# Patient Record
Sex: FEMALE | Race: WHITE | NOT HISPANIC OR LATINO | Employment: OTHER | URBAN - METROPOLITAN AREA
[De-identification: names, ages, dates, MRNs, and addresses within clinical notes are randomized per-mention and may not be internally consistent; named-entity substitution may affect disease eponyms.]

---

## 2017-02-03 ENCOUNTER — TELEPHONE (OUTPATIENT)
Dept: NEUROSURGERY | Facility: CLINIC | Age: 32
End: 2017-02-03

## 2017-02-03 DIAGNOSIS — S06.5XAA SUBDURAL HEMATOMA: Primary | ICD-10-CM

## 2017-02-09 ENCOUNTER — TELEPHONE (OUTPATIENT)
Dept: INTERNAL MEDICINE | Facility: CLINIC | Age: 32
End: 2017-02-09

## 2017-02-09 DIAGNOSIS — M79.641 RIGHT HAND PAIN: Primary | ICD-10-CM
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
## 2017-02-10 ENCOUNTER — OFFICE VISIT (OUTPATIENT)
Dept: INTERNAL MEDICINE | Facility: CLINIC | Age: 32
End: 2017-02-10
Payer: MEDICAID

## 2017-02-10 ENCOUNTER — HOSPITAL ENCOUNTER (OUTPATIENT)
Dept: RADIOLOGY | Facility: HOSPITAL | Age: 32
Discharge: HOME | End: 2017-02-10
Attending: INTERNAL MEDICINE
Payer: MEDICAID

## 2017-02-10 VITALS
HEIGHT: 60 IN | SYSTOLIC BLOOD PRESSURE: 100 MMHG | BODY MASS INDEX: 24.37 KG/M2 | WEIGHT: 124.13 LBS | DIASTOLIC BLOOD PRESSURE: 60 MMHG | HEART RATE: 82 BPM | OXYGEN SATURATION: 98 %

## 2017-02-10 DIAGNOSIS — M79.641 RIGHT HAND PAIN: ICD-10-CM

## 2017-02-10 DIAGNOSIS — F70 MILD MENTAL RETARDATION: ICD-10-CM

## 2017-02-10 DIAGNOSIS — F91.9 DESTRUCTIVE BEHAVIOR DISORDER: ICD-10-CM

## 2017-02-10 DIAGNOSIS — S62.609A FINGER FRACTURE, RIGHT, CLOSED, INITIAL ENCOUNTER: Primary | ICD-10-CM

## 2017-02-10 PROCEDURE — 73130 X-RAY EXAM OF HAND: CPT | Mod: 26,RT,, | Performed by: RADIOLOGY

## 2017-02-10 PROCEDURE — 99214 OFFICE O/P EST MOD 30 MIN: CPT | Mod: S$PBB,,, | Performed by: INTERNAL MEDICINE

## 2017-02-10 PROCEDURE — 99999 PR PBB SHADOW E&M-EST. PATIENT-LVL II: CPT | Mod: PBBFAC,,, | Performed by: INTERNAL MEDICINE

## 2017-02-10 PROCEDURE — 99212 OFFICE O/P EST SF 10 MIN: CPT | Mod: PBBFAC | Performed by: INTERNAL MEDICINE

## 2017-02-10 RX ORDER — CLONAZEPAM 0.5 MG/1
0.5 TABLET ORAL DAILY
COMMUNITY

## 2017-02-10 RX ORDER — DOCUSATE SODIUM 100 MG/1
100 CAPSULE, LIQUID FILLED ORAL DAILY
COMMUNITY
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
## 2017-02-16 ENCOUNTER — DOCUMENTATION ONLY (OUTPATIENT)
Dept: INTERNAL MEDICINE | Facility: CLINIC | Age: 32
End: 2017-02-16

## 2017-02-16 ENCOUNTER — HOSPITAL ENCOUNTER (EMERGENCY)
Facility: HOSPITAL | Age: 32
Discharge: HOME | End: 2017-02-18
Attending: EMERGENCY MEDICINE
Payer: MEDICAID

## 2017-02-16 DIAGNOSIS — F70 MILD INTELLECTUAL DISABILITY: ICD-10-CM

## 2017-02-16 DIAGNOSIS — Z86.59 HISTORY OF IMPULSIVE BEHAVIOR: ICD-10-CM

## 2017-02-16 DIAGNOSIS — R46.89 AGGRESSIVE BEHAVIOR: Primary | ICD-10-CM

## 2017-02-16 LAB
ALBUMIN SERPL BCP-MCNC: 3 G/DL
ALP SERPL-CCNC: 29 U/L
ALT SERPL W/O P-5'-P-CCNC: 18 U/L
AMPHET+METHAMPHET UR QL: NEGATIVE
ANION GAP SERPL CALC-SCNC: 4 MMOL/L
AST SERPL-CCNC: 20 U/L
B-HCG UR QL: NEGATIVE
BARBITURATES UR QL SCN>200 NG/ML: NEGATIVE
BASOPHILS # BLD AUTO: 0.02 K/UL
BASOPHILS NFR BLD: 0.3 %
BENZODIAZ UR QL SCN>200 NG/ML: NEGATIVE
BILIRUB SERPL-MCNC: 0.2 MG/DL
BILIRUB UR QL STRIP: NEGATIVE
BILIRUB UR QL STRIP: NEGATIVE
BUN SERPL-MCNC: 14 MG/DL
BZE UR QL SCN: NEGATIVE
CALCIUM SERPL-MCNC: 8.8 MG/DL
CANNABINOIDS UR QL SCN: NEGATIVE
CHLORIDE SERPL-SCNC: 105 MMOL/L
CLARITY UR REFRACT.AUTO: CLEAR
CLARITY UR REFRACT.AUTO: CLEAR
CO2 SERPL-SCNC: 28 MMOL/L
COLOR UR AUTO: YELLOW
COLOR UR AUTO: YELLOW
CREAT SERPL-MCNC: 0.6 MG/DL
CREAT UR-MCNC: 81 MG/DL
DIFFERENTIAL METHOD: ABNORMAL
EOSINOPHIL # BLD AUTO: 0.1 K/UL
EOSINOPHIL NFR BLD: 1.9 %
ERYTHROCYTE [DISTWIDTH] IN BLOOD BY AUTOMATED COUNT: 11.9 %
EST. GFR  (AFRICAN AMERICAN): >60 ML/MIN/1.73 M^2
EST. GFR  (NON AFRICAN AMERICAN): >60 ML/MIN/1.73 M^2
GLUCOSE SERPL-MCNC: 82 MG/DL
GLUCOSE UR QL STRIP: NEGATIVE
GLUCOSE UR QL STRIP: NEGATIVE
HCT VFR BLD AUTO: 36.6 %
HGB BLD-MCNC: 12.3 G/DL
HGB UR QL STRIP: NEGATIVE
HGB UR QL STRIP: NEGATIVE
KETONES UR QL STRIP: NEGATIVE
KETONES UR QL STRIP: NEGATIVE
LEUKOCYTE ESTERASE UR QL STRIP: NEGATIVE
LEUKOCYTE ESTERASE UR QL STRIP: NEGATIVE
LYMPHOCYTES # BLD AUTO: 3 K/UL
LYMPHOCYTES NFR BLD: 42.1 %
MCH RBC QN AUTO: 31.5 PG
MCHC RBC AUTO-ENTMCNC: 33.6 %
MCV RBC AUTO: 94 FL
METHADONE UR QL SCN>300 NG/ML: NEGATIVE
MONOCYTES # BLD AUTO: 0.8 K/UL
MONOCYTES NFR BLD: 11.6 %
NEUTROPHILS # BLD AUTO: 3.1 K/UL
NEUTROPHILS NFR BLD: 43.7 %
NITRITE UR QL STRIP: NEGATIVE
NITRITE UR QL STRIP: NEGATIVE
OPIATES UR QL SCN: NEGATIVE
PCP UR QL SCN>25 NG/ML: NEGATIVE
PH UR STRIP: 8 [PH] (ref 5–8)
PH UR STRIP: 8 [PH] (ref 5–8)
PLATELET # BLD AUTO: 166 K/UL
PMV BLD AUTO: 9.5 FL
POTASSIUM SERPL-SCNC: 4 MMOL/L
PROT SERPL-MCNC: 6.5 G/DL
PROT UR QL STRIP: NEGATIVE
PROT UR QL STRIP: NEGATIVE
RBC # BLD AUTO: 3.91 M/UL
SODIUM SERPL-SCNC: 137 MMOL/L
SP GR UR STRIP: 1.01 (ref 1–1.03)
SP GR UR STRIP: 1.01 (ref 1–1.03)
TOXICOLOGY INFORMATION: NORMAL
TSH SERPL DL<=0.005 MIU/L-ACNC: 2.11 UIU/ML
URN SPEC COLLECT METH UR: NORMAL
URN SPEC COLLECT METH UR: NORMAL
UROBILINOGEN UR STRIP-ACNC: NEGATIVE EU/DL
UROBILINOGEN UR STRIP-ACNC: NEGATIVE EU/DL
WBC # BLD AUTO: 7.01 K/UL

## 2017-02-16 PROCEDURE — 25000003 PHARM REV CODE 250: Performed by: EMERGENCY MEDICINE

## 2017-02-16 PROCEDURE — 99285 EMERGENCY DEPT VISIT HI MDM: CPT | Mod: 25

## 2017-02-16 PROCEDURE — 80053 COMPREHEN METABOLIC PANEL: CPT

## 2017-02-16 PROCEDURE — 96372 THER/PROPH/DIAG INJ SC/IM: CPT

## 2017-02-16 PROCEDURE — 25000003 PHARM REV CODE 250: Performed by: STUDENT IN AN ORGANIZED HEALTH CARE EDUCATION/TRAINING PROGRAM

## 2017-02-16 PROCEDURE — 82570 ASSAY OF URINE CREATININE: CPT

## 2017-02-16 PROCEDURE — 85025 COMPLETE CBC W/AUTO DIFF WBC: CPT

## 2017-02-16 PROCEDURE — 81025 URINE PREGNANCY TEST: CPT

## 2017-02-16 PROCEDURE — 99284 EMERGENCY DEPT VISIT MOD MDM: CPT | Mod: ,,, | Performed by: EMERGENCY MEDICINE

## 2017-02-16 PROCEDURE — 81003 URINALYSIS AUTO W/O SCOPE: CPT

## 2017-02-16 PROCEDURE — 84443 ASSAY THYROID STIM HORMONE: CPT

## 2017-02-16 RX ORDER — CLONAZEPAM 0.5 MG/1
0.5 TABLET ORAL 2 TIMES DAILY
Status: DISCONTINUED | OUTPATIENT
Start: 2017-02-16 | End: 2017-02-16

## 2017-02-16 RX ORDER — DOCUSATE SODIUM 100 MG/1
100 CAPSULE, LIQUID FILLED ORAL 2 TIMES DAILY
Status: DISCONTINUED | OUTPATIENT
Start: 2017-02-16 | End: 2017-02-18 | Stop reason: HOSPADM

## 2017-02-16 RX ORDER — HALOPERIDOL 5 MG/ML
5 INJECTION INTRAMUSCULAR EVERY 4 HOURS PRN
Status: DISCONTINUED | OUTPATIENT
Start: 2017-02-16 | End: 2017-02-18 | Stop reason: HOSPADM

## 2017-02-16 RX ORDER — DIVALPROEX SODIUM 250 MG/1
500 TABLET, DELAYED RELEASE ORAL EVERY 12 HOURS
Status: DISCONTINUED | OUTPATIENT
Start: 2017-02-16 | End: 2017-02-18 | Stop reason: HOSPADM

## 2017-02-16 RX ORDER — FERROUS SULFATE, DRIED 160(50) MG
1 TABLET, EXTENDED RELEASE ORAL 2 TIMES DAILY
Status: DISCONTINUED | OUTPATIENT
Start: 2017-02-16 | End: 2017-02-18 | Stop reason: HOSPADM

## 2017-02-16 RX ORDER — CLONAZEPAM 0.5 MG/1
0.5 TABLET ORAL 2 TIMES DAILY
Status: DISCONTINUED | OUTPATIENT
Start: 2017-02-16 | End: 2017-02-18 | Stop reason: HOSPADM

## 2017-02-16 RX ORDER — SERTRALINE HYDROCHLORIDE 50 MG/1
100 TABLET, FILM COATED ORAL DAILY
Status: DISCONTINUED | OUTPATIENT
Start: 2017-02-16 | End: 2017-02-17

## 2017-02-16 RX ORDER — LORAZEPAM 1 MG/1
1 TABLET ORAL
Status: DISCONTINUED | OUTPATIENT
Start: 2017-02-16 | End: 2017-02-16

## 2017-02-16 RX ORDER — SERTRALINE HYDROCHLORIDE 50 MG/1
100 TABLET, FILM COATED ORAL DAILY
Status: DISCONTINUED | OUTPATIENT
Start: 2017-02-17 | End: 2017-02-16

## 2017-02-16 RX ORDER — LORAZEPAM 2 MG/ML
2 INJECTION INTRAMUSCULAR EVERY 6 HOURS PRN
Status: DISCONTINUED | OUTPATIENT
Start: 2017-02-16 | End: 2017-02-18 | Stop reason: HOSPADM

## 2017-02-16 RX ORDER — LORAZEPAM 1 MG/1
1 TABLET ORAL
Status: COMPLETED | OUTPATIENT
Start: 2017-02-16 | End: 2017-02-16

## 2017-02-16 RX ADMIN — SERTRALINE HYDROCHLORIDE 100 MG: 50 TABLET ORAL at 09:02

## 2017-02-16 RX ADMIN — LORAZEPAM 1 MG: 1 TABLET ORAL at 07:02

## 2017-02-16 RX ADMIN — DIVALPROEX SODIUM 500 MG: 250 TABLET, DELAYED RELEASE ORAL at 09:02

## 2017-02-16 RX ADMIN — CLONAZEPAM 0.5 MG: 0.5 TABLET ORAL at 09:02

## 2017-02-16 NOTE — ED AVS SNAPSHOT
[facility]  [address]  [facility] [address]               Sydnee Bosch   2017  4:50 PM   ED    Description:  Female : 1985   [facility]           Your Care was Coordinated By:     Provider Role From To    Kushal Polo MD Attending Provider [id number] --      Reason for Visit     Psychiatric Evaluation           Diagnoses this Visit        Comments    Aggressive behavior    -  Primary     History of impulsive behavior         Mild intellectual disability           ED Disposition     ED Disposition Condition Comment    Transfer to Another Facility  Review           To Do List           Follow-up Information     Follow [facility] with [facility]    Specialty:  Emergency Medicine    Why:  If symptoms worsen    Contact information:    [address]  [facility] [id number] [phone]      [facility] On [facility]     [facility] -  Assistance  Registered nurses in the [facility] provide clinical advisement, health education, appointment booking, and other advisory services.  Call for this free service at [phone].             Medications           Message regarding Medications     Verify the changes and/or additions to your medication regime listed below are the same as discussed with your clinician today.  If any of these changes or additions are incorrect, please notify your healthcare provider.        These medications were administered today        Dose Freq    lorazepam tablet 1 mg 1 mg ED 1 Time    Sig: Take 1 tablet (1 mg total) by mouth ED 1 Time.    Class: Normal    Route: Oral    haloperidol lactate injection 5 mg 5 mg Every 4 hours PRN    Sig: Inject 1 mL (5 mg total) into the muscle every 4 (four) hours as needed for Agitation.    Class: Normal    Route: Intramuscular    lorazepam injection 2 mg 2 mg Every 6 hours PRN    Sig: Inject 1 mL (2 mg  total) into the muscle every 6 (six) hours as needed for Anxiety (non-redirectable agitation).    Class: Normal    Route: Intramuscular    docusate sodium capsule 100 mg 100 mg 2 times daily    Sig: Take 1 capsule (100 mg total) by mouth 2 (two) times daily.    Class: Normal    Route: Oral    lorazepam tablet 2 mg 2 mg ED 1 Time    Sig: Take 2 tablets (2 mg total) by mouth ED 1 Time.    Class: Normal    Route: Oral    sertraline tablet 50 mg 50 mg Daily    Sig: Take 1 tablet (50 mg total) by mouth once daily.    Class: Normal    Route: Oral           Verify that the below list of medications is an accurate representation of the medications you are currently taking.  If none reported, the list may be blank. If incorrect, please contact your healthcare provider. Carry this list with you in case of emergency.           Current Medications     CALCIUM CARBONATE/VITAMIN D3 (CALCIUM 600 + D,3, ORAL) Take 1 tablet by mouth 2 (two) times daily.    calcium-vitamin D3 500 mg(1,250mg) -200 unit per tablet 1 tablet Take 1 tablet by mouth 2 (two) times daily.    chlorproMAZINE (THORAZINE) 25 mg/mL injection Inject 2 mLs (50 mg total) into the muscle every 6 (six) hours as needed (2nd line agent for agitation, if patient refuses po medications). Per Psychiatry recommendations    chlorproMAZINE (THORAZINE) 50 MG tablet Take 1 tablet (50 mg total) by mouth every 6 (six) hours as needed (1st line agent for agitation, please use before IM thorazine). Per Psychiatry recommendations    clonazePAM (KLONOPIN) 0.5 MG tablet Take 0.5 mg by mouth 2 (two) times daily.    clonazePAM tablet 0.5 mg Take 1 tablet (0.5 mg total) by mouth 2 (two) times daily.    divalproex (DEPAKOTE) 500 MG TbEC Take 1 tablet (500 mg total) by mouth every 12 (twelve) hours. Patient was taking qAM and with lunch    divalproex EC tablet 500 mg Take 2 tablets (500 mg total) by mouth every 12 (twelve) hours.    docusate sodium (COLACE) 100 MG capsule Take 100 mg by  mouth 2 (two) times daily.    docusate sodium capsule 100 mg Take 1 capsule (100 mg total) by mouth 2 (two) times daily.    haloperidol lactate injection 5 mg Inject 1 mL (5 mg total) into the muscle every 4 (four) hours as needed for Agitation.    lorazepam injection 2 mg Inject 1 mL (2 mg total) into the muscle every 6 (six) hours as needed for Anxiety (non-redirectable agitation).    norgestimate-ethinyl estradiol (TRINESSA, 28,) 0.18/0.215/0.25 mg-35 mcg (28) tablet Take 1 tablet by mouth once daily.    sertraline (ZOLOFT) 100 MG tablet Take 1 tablet (100 mg total) by mouth once daily.    sertraline tablet 100 mg Take 2 tablets (100 mg total) by mouth every evening.           Clinical Reference Information           Your Vitals Were     BP Pulse Temp Resp Weight SpO2    112/79 85 98.3 °F (36.8 °C) (Oral) 18 61.2 kg (135 lb) 99%    BMI                26.37 kg/m2          Allergies as of 2/18/2017     No Known Allergies      Immunizations Administered on Date of Encounter - 2/18/2017     None      ED Micro, Lab, POCT     Start Ordered       Status Ordering Provider    02/18/17 0400 02/17/17 1051  Valproic Acid  Early Morning Draw      Final result     02/16/17 1726 02/16/17 1725  Pregnancy, urine rapid  STAT      Final result     02/16/17 1724 02/16/17 1724  Urinalysis - clean catch  STAT      Final result     02/16/17 1721 02/16/17 1722  CBC auto differential  STAT      Final result     02/16/17 1721 02/16/17 1722  Comprehensive metabolic panel  STAT      Final result     02/16/17 1721 02/16/17 1722  TSH  STAT      Final result     02/16/17 1721 02/16/17 1722  Urinalysis - clean catch  STAT      Final result     02/16/17 1721 02/16/17 1722  Drug screen panel, emergency  STAT      Final result       ED Imaging Orders     None        Discharge Instructions         When a Loved One Has a Mental Illness  Its hard to watch a loved one deal with mental illness. You want to help. Yet you may not know what to do. Your  loved one may even push you away. But dont give up. Your support is needed now more than ever. Talk to your loved ones health care provider. Or, contact a group for families of people with mental illness. They can help give you the guidance you need.    What you can do  Living with mental illness can be overwhelming. Your loved one may say or do things that shock or frighten you. Sometimes, your loved one may resist treatment. Knowing what to do can help you cope:  · Help your loved one get proper care. Often, people with a mental illness deny theres a problem. Or, they may not be able to seek help on their own.  · Encourage your loved one to stick with treatment. This may be your most crucial job. Medicines that treat mental illness can have side effects. As a result, your loved one may stop taking them. But this will likely cause symptoms to come back. You might also want to attend health care provider visits with your loved one to discuss medicine and other issues.  · Provide emotional support. Encourage your loved one to share his or her feelings. Listen, and dont . Let your loved one know he or she can count on you.  · Be patient. The healing process takes time. In some cases, your loved one may never fully recover. But his or her symptoms will likely improve.  · Invite your loved one to take part in activities. But dont push.  · Take care of yourself. Helping your loved one can be very stressful. Take time to care for yourself. Youll have more patience and will be better able to cope.  Resources  [employer] on Mental Illness 615-736-3254 www.bhavna.org  National [place] of Mental Health 860-507-7647 www.nimh.nih.gov  Mental Health [place] [phone] www.nmha.org   Date Last Reviewed: 6/10/2015  © 2832-7032 [facility]. [address] [place] [address], [place] [address]. All rights reserved. This information is not intended as a substitute for professional medical care. Always follow  your healthcare professional's instructions.          Your Scheduled Appointments     Mar 06, 2017 10:40 AM CST   Ct Head Non Contrast with [facility] CT6 MOBILE LIMIT 450 LBS   [facility]    [address] [facility] [address] [address]  [facility] [address] [phone]            Mar 06, 2017 12:45 PM CST   Established Patient Visit with MD Dhruv Barclay mary alice - Neurosurgery 7th Fl ([facility] )    [address]  [place] [address] [phone]               [facility] complies with applicable Federal civil rights laws and does not discriminate on the basis of race, color, national origin, age, disability, or sex.        Language Assistance Services     ATTENTION: Language assistance services are available, free of charge. Please call [phone].      ATENCIÓN: Si habla español, tiene a tripathi disposición servicios gratuitos de asistencia lingüística. Llame al [phone].     CHÚ Ý: N?u b?n nói Ti?ng Vi?t, có các d?ch v? h? tr? ngôn ng? mi?n phí dành cho b?n. G?i s? [phone].

## 2017-02-16 NOTE — ED NOTES
[employer] called in triage/ sitter present with pt, security with patient, and [employer] employee/worker sitting with pt as well
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
## 2017-02-17 PROCEDURE — 25000003 PHARM REV CODE 250: Performed by: STUDENT IN AN ORGANIZED HEALTH CARE EDUCATION/TRAINING PROGRAM

## 2017-02-17 PROCEDURE — 25000003 PHARM REV CODE 250: Performed by: EMERGENCY MEDICINE

## 2017-02-17 PROCEDURE — 63600175 PHARM REV CODE 636 W HCPCS: Performed by: EMERGENCY MEDICINE

## 2017-02-17 PROCEDURE — 25000003 PHARM REV CODE 250: Performed by: PSYCHIATRY & NEUROLOGY

## 2017-02-17 RX ORDER — SERTRALINE HYDROCHLORIDE 50 MG/1
50 TABLET, FILM COATED ORAL DAILY
Status: COMPLETED | OUTPATIENT
Start: 2017-02-17 | End: 2017-02-17

## 2017-02-17 RX ORDER — LORAZEPAM 1 MG/1
2 TABLET ORAL
Status: COMPLETED | OUTPATIENT
Start: 2017-02-17 | End: 2017-02-17

## 2017-02-17 RX ORDER — SERTRALINE HYDROCHLORIDE 50 MG/1
100 TABLET, FILM COATED ORAL NIGHTLY
Status: DISCONTINUED | OUTPATIENT
Start: 2017-02-18 | End: 2017-02-18 | Stop reason: HOSPADM

## 2017-02-17 RX ADMIN — LORAZEPAM 2 MG: 1 TABLET ORAL at 02:02

## 2017-02-17 RX ADMIN — OYSTER SHELL CALCIUM WITH VITAMIN D 1 TABLET: 500; 200 TABLET, FILM COATED ORAL at 08:02

## 2017-02-17 RX ADMIN — CLONAZEPAM 0.5 MG: 0.5 TABLET ORAL at 11:02

## 2017-02-17 RX ADMIN — SERTRALINE HYDROCHLORIDE 50 MG: 50 TABLET, FILM COATED ORAL at 09:02

## 2017-02-17 RX ADMIN — DIVALPROEX SODIUM 500 MG: 250 TABLET, DELAYED RELEASE ORAL at 09:02

## 2017-02-17 RX ADMIN — OYSTER SHELL CALCIUM WITH VITAMIN D 1 TABLET: 500; 200 TABLET, FILM COATED ORAL at 11:02

## 2017-02-17 RX ADMIN — CLONAZEPAM 0.5 MG: 0.5 TABLET ORAL at 08:02

## 2017-02-17 RX ADMIN — HALOPERIDOL 5 MG: 5 INJECTION INTRAMUSCULAR at 09:02

## 2017-02-17 RX ADMIN — LORAZEPAM 2 MG: 2 INJECTION INTRAMUSCULAR; INTRAVENOUS at 08:02

## 2017-02-17 RX ADMIN — DOCUSATE SODIUM 100 MG: 100 CAPSULE, LIQUID FILLED ORAL at 08:02

## 2017-02-17 RX ADMIN — DIVALPROEX SODIUM 500 MG: 250 TABLET, DELAYED RELEASE ORAL at 11:02

## 2017-02-17 RX ADMIN — DOCUSATE SODIUM 100 MG: 100 CAPSULE, LIQUID FILLED ORAL at 09:02

## 2017-02-17 RX ADMIN — SERTRALINE HYDROCHLORIDE 100 MG: 50 TABLET ORAL at 09:02
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
## 2017-02-17 NOTE — ED NOTES
PEC packet faxed to [facility], [facility], [facility], [facility], [facility], [facility], [facility], [facility], [facility], [facility], [facility]

## 2017-02-17 NOTE — ED NOTES
Sitting in chair at bedside, listening to music.Calm. Father and sitter at bedside.Breakfast ordered

## 2017-02-17 NOTE — ED NOTES
[facility] received PEC. Actively seeking PEC placement at this time. PEC packet faxed to [facility], [facility], [facility], [facility], [facility], [facility], [facility], [facility], [facility], [facility], [facility] at this time.
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
## 2017-02-17 NOTE — ED NOTES
PEC packet faxed to [facility], [facility], [facility], [facility], [facility], [facility], [facility], [facility], [facility], [facility], [facility], [facility] at this time.
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
## 2017-02-17 NOTE — ED NOTES
"Father state's " she got this medication last night and physician told father that it's important that she get the medication at night" Zoloft given 100mg to be given tonight with instructions  "

## 2017-02-17 NOTE — ED NOTES
Spoke w/ Juliet @ [facility] awaiting call back to check acuity of unit for inpatient acceptance.

## 2017-02-18 VITALS
DIASTOLIC BLOOD PRESSURE: 79 MMHG | BODY MASS INDEX: 26.37 KG/M2 | OXYGEN SATURATION: 99 % | HEART RATE: 79 BPM | RESPIRATION RATE: 17 BRPM | TEMPERATURE: 98 F | WEIGHT: 135 LBS | SYSTOLIC BLOOD PRESSURE: 115 MMHG

## 2017-02-18 LAB — VALPROATE SERPL-MCNC: 41.8 UG/ML

## 2017-02-18 PROCEDURE — 25000003 PHARM REV CODE 250: Performed by: STUDENT IN AN ORGANIZED HEALTH CARE EDUCATION/TRAINING PROGRAM

## 2017-02-18 PROCEDURE — 80164 ASSAY DIPROPYLACETIC ACD TOT: CPT

## 2017-02-18 RX ORDER — LORAZEPAM 1 MG/1
2 TABLET ORAL
Status: DISCONTINUED | OUTPATIENT
Start: 2017-02-18 | End: 2017-02-18

## 2017-02-18 RX ADMIN — DIVALPROEX SODIUM 500 MG: 250 TABLET, DELAYED RELEASE ORAL at 09:02

## 2017-02-18 RX ADMIN — DOCUSATE SODIUM 100 MG: 100 CAPSULE, LIQUID FILLED ORAL at 09:02

## 2017-02-18 RX ADMIN — CLONAZEPAM 0.5 MG: 0.5 TABLET ORAL at 09:02

## 2017-02-18 RX ADMIN — OYSTER SHELL CALCIUM WITH VITAMIN D 1 TABLET: 500; 200 TABLET, FILM COATED ORAL at 09:02

## 2017-02-18 NOTE — DISCHARGE INSTRUCTIONS
When a Loved One Has a Mental Illness  Its hard to watch a loved one deal with mental illness. You want to help. Yet you may not know what to do. Your loved one may even push you away. But dont give up. Your support is needed now more than ever. Talk to your loved ones health care provider. Or, contact a group for families of people with mental illness. They can help give you the guidance you need.    What you can do  Living with mental illness can be overwhelming. Your loved one may say or do things that shock or frighten you. Sometimes, your loved one may resist treatment. Knowing what to do can help you cope:  · Help your loved one get proper care. Often, people with a mental illness deny theres a problem. Or, they may not be able to seek help on their own.  · Encourage your loved one to stick with treatment. This may be your most crucial job. Medicines that treat mental illness can have side effects. As a result, your loved one may stop taking them. But this will likely cause symptoms to come back. You might also want to attend health care provider visits with your loved one to discuss medicine and other issues.  · Provide emotional support. Encourage your loved one to share his or her feelings. Listen, and dont . Let your loved one know he or she can count on you.  · Be patient. The healing process takes time. In some cases, your loved one may never fully recover. But his or her symptoms will likely improve.  · Invite your loved one to take part in activities. But dont push.  · Take care of yourself. Helping your loved one can be very stressful. Take time to care for yourself. Youll have more patience and will be better able to cope.  Resources  National [place] on Mental Illness [phone] www.bhavna.org  National [place] of Mental Health 435-923-9158 www.nimh.nih.gov  Mental Health [place] [phone] www.nmha.org   Date Last Reviewed: 6/10/2015  © 3675-4158 [employer] [address]  [place] [address]. All rights reserved. This information is not intended as a substitute for professional medical care. Always follow your healthcare professional's instructions.

## 2017-02-18 NOTE — ED NOTES
Family informed about pt's status. Family understands that pt will be spending tonight in the ER.

## 2017-02-18 NOTE — ED NOTES
No signs of distress noted. Patient is in paper gown. Patient rights signed and on the chart. All cords and wires are out of the patients room. Patient belongings are removed from the room labeled and locked away. P.E.C is completed and on the chart. Patient sitter is at the bedside recording Q 15 minute checks. Sitter belongings are out of the room. Will continue to monitor the patient.

## 2017-02-18 NOTE — ED NOTES
"Pt's father is expressing concerns about about pt's PEC. Pt's father states that "she is not receiving adequate medical care here, she does not understand what is going on and this is a punishment, it is like incarceration."    "

## 2017-02-18 NOTE — ED NOTES
Verbal contact to facilities accepting of MR patients     [facility] : [facility] : Left message  [facility] : Left message  [facility] : [facility] diversion  [facility] no [facility] for [facility]:1, denies violent/aggressive patients, no accomodation for the blind  [facility] : Left message

## 2017-02-18 NOTE — ED NOTES
Father expressing concerns about pt's medication schedule in regards to her Zoloft, he would like the Zoloft given at night instead of in the morning. Psych paged.
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
## 2017-02-18 NOTE — PROVIDER PROGRESS NOTES - EMERGENCY DEPT.
Encounter Date: 2/16/2017    ED Physician Progress Notes        Physician Note:   Patient has remained in the ED x 2 days.  Unable to find inpatient psychiatric placement.  Psychiatry resident has had a long d/w family who have now opted to take pt home and manage her psychiatric issues there.  They were told that the pt requires around the clock care and they are aware of this and will arrange this independently.  Will d/c home

## 2017-04-03 ENCOUNTER — HOSPITAL ENCOUNTER (OUTPATIENT)
Dept: RADIOLOGY | Facility: HOSPITAL | Age: 32
Discharge: HOME | End: 2017-04-03
Attending: NEUROLOGICAL SURGERY
Payer: MEDICAID

## 2017-04-03 ENCOUNTER — OFFICE VISIT (OUTPATIENT)
Dept: NEUROSURGERY | Facility: CLINIC | Age: 32
End: 2017-04-03
Payer: MEDICAID

## 2017-04-03 VITALS
TEMPERATURE: 98 F | HEIGHT: 61 IN | WEIGHT: 109 LBS | HEART RATE: 73 BPM | DIASTOLIC BLOOD PRESSURE: 60 MMHG | SYSTOLIC BLOOD PRESSURE: 110 MMHG | BODY MASS INDEX: 20.58 KG/M2

## 2017-04-03 DIAGNOSIS — F79 MENTAL RETARDATION: Primary | ICD-10-CM

## 2017-04-03 DIAGNOSIS — S06.5XAA SUBDURAL HEMATOMA: ICD-10-CM

## 2017-04-03 PROCEDURE — 99213 OFFICE O/P EST LOW 20 MIN: CPT | Mod: PBBFAC | Performed by: NEUROLOGICAL SURGERY

## 2017-04-03 PROCEDURE — 70450 CT HEAD/BRAIN W/O DYE: CPT | Mod: 26,,, | Performed by: RADIOLOGY

## 2017-04-03 PROCEDURE — 99999 PR PBB SHADOW E&M-EST. PATIENT-LVL III: CPT | Mod: PBBFAC,,, | Performed by: NEUROLOGICAL SURGERY

## 2017-04-03 PROCEDURE — 99213 OFFICE O/P EST LOW 20 MIN: CPT | Mod: S$PBB,,, | Performed by: NEUROLOGICAL SURGERY

## 2017-04-03 NOTE — MR AVS SNAPSHOT
[facility]  [address]  [place] [address]  Phone: [phone]                  Sydnee Bosch   4/3/2017 10:30 AM   Office Visit    Description:  Female : 1985   Provider:  Juventino Mccabe MD   Department:  [facility]           Diagnoses this Visit        Comments    Mental retardation    -  Primary            To Do List           Goals (5 Years of Data)     None      Follow-Up and Disposition     Return if symptoms worsen or fail to improve.      [facility]     [facility] -  Assistance  Unless otherwise directed by your provider, please contact [facility], our nurse care line that is available for  assistance.     Registered nurses in the [facility] provide: appointment scheduling, clinical advisement, health education, and other advisory services.  Call: [phone] (toll free)               Medications           Message regarding Medications     Verify the changes and/or additions to your medication regime listed below are the same as discussed with your clinician today.  If any of these changes or additions are incorrect, please notify your healthcare provider.        STOP taking these medications     chlorproMAZINE (THORAZINE) 25 mg/mL injection Inject 2 mLs (50 mg total) into the muscle every 6 (six) hours as needed (2nd line agent for agitation, if patient refuses po medications). Per Psychiatry recommendations    chlorproMAZINE (THORAZINE) 50 MG tablet Take 1 tablet (50 mg total) by mouth every 6 (six) hours as needed (1st line agent for agitation, please use before IM thorazine). Per Psychiatry recommendations    divalproex (DEPAKOTE) 500 MG TbEC Take 1 tablet (500 mg total) by mouth every 12 (twelve) hours. Patient was taking qAM and with lunch           Verify that the below list of medications is an accurate representation of the medications you are currently taking.  If none  "reported, the list may be blank. If incorrect, please contact your healthcare provider. Carry this list with you in case of emergency.           Current Medications     CALCIUM CARBONATE/VITAMIN D3 (CALCIUM 600 + D,3, ORAL) Take 1 tablet by mouth 2 (two) times daily.    clonazePAM (KLONOPIN) 0.5 MG tablet Take 0.5 mg by mouth once daily.     docusate sodium (COLACE) 100 MG capsule Take 100 mg by mouth once daily.     norgestimate-ethinyl estradiol (TRINESSA, 28,) 0.18/0.215/0.25 mg-35 mcg (28) tablet Take 1 tablet by mouth once daily.    sertraline (ZOLOFT) 100 MG tablet Take 1 tablet (100 mg total) by mouth once daily.           Clinical Reference Information           Your Vitals Were     BP Pulse Temp Height Weight Last Period    110/60 73 97.8 °F (36.6 °C) (Oral) 5' 1" (1.549 m) 49.4 kg (109 lb) 03/20/2017    BMI                20.6 kg/m2          Blood Pressure          Most Recent Value    BP  110/60      Allergies as of 4/3/2017     No Known Allergies      Immunizations Administered on Date of Encounter - 4/3/2017     None      Language Assistance Services     ATTENTION: Language assistance services are available, free of charge. Please call [phone].      ATENCIÓN: Si sadie lupe, tiene a tripathi disposición servicios gratuitos de asistencia lingüística. Llame al [phone].     [facility] Ý: N?u b?n nói Ti?ng Vi?t, có các d?ch v? h? tr? ngôn ng? mi?n phí dành cho b?n. G?i s? [phone].         [facility] [address] complies with applicable Federal civil rights laws and does not discriminate on the basis of race, color, national origin, age, disability, or sex.        "

## 2017-04-03 NOTE — LETTER
April 3, 2017      Bia Haines MD  [address] [facility] [address]           [facility]  [address]  [facility] [address]  Phone: [phone]          Patient: Syndee Bosch   MR Number: [id number]   YOB: 1985   Date of Visit: 4/3/2017       Dear Dr. Bia Haines:    Thank you for referring Sydnee Bosch to me for evaluation. Attached you will find relevant portions of my assessment and plan of care.    If you have questions, please do not hesitate to call me. I look forward to following Sydnee Bosch along with you.    Sincerely,    Juventino Mccabe MD    Enclosure  CC:  No Recipients    If you would like to receive this communication electronically, please contact [email or web address] or [phone] to request more information on [employer] Link access.    For providers and/or their staff who would like to refer a patient to [facility], please contact us through our one-stop-shop provider referral line, [facility], at [phone].    If you feel you have received this communication in error or would no longer like to receive these types of communications, please e-mail [email or web address]

## 2017-04-03 NOTE — PROGRESS NOTES
This office note has been dictated.  Sydnee Bosch was seen in neurosurgical followup at the office this morning.  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  She developed a behavioral   disturbance and had an episode of head banging without loss of consciousness on   12/10/16.  She developed a large subgaleal hematoma and was brought to [facility] ER   where a CT scan of the head suggested possible trace subdural bleeding in the   left parietal area.  There was motion artifact, so this was not conclusive.  A   followup scan done on 12/11/16, looked about the same again somewhat degraded by   motion artifact.  There was no compression on the brain and it was felt that   the patient could be safely discharged.  She has not returned to the [employer] apparently because of some concern about injuring herself and has been   cared for at home by her parents with nursing help.  Her father says she has   been doing quite well over the last three months.  Her behavior has been good.    She has had no further episodes of acting out or agitation.    On brief examination today, she responds appropriately to questions.  She shows   rocking motion, which she says makes her feel better.  She is blind, but   otherwise shows no focal weakness.    CT scan of the brain was repeated at [facility] this morning.  On this scan,   there is less motion artifact.  Ventricular size is normal.  No extraaxial   fluid collections are noted.  The scan appears essentially unremarkable.    When she was seen after head trauma, there was a question of trace subdural   bleeding on the left.  This was not completely verified, but would be consistent   with the scalp hematoma and history.  This would be expected to resolve if it   were present and the scan today is unremarkable.  She seems to be doing better   with respect to her behavioral issues.  I did not  make her a definite   appointment to return to Neurosurgery, but will of course be happy to see her   back if any new problems arise.      RDS/HN  dd: 04/03/2017 11:43:36 (CDT)  td: 04/04/2017 03:59:07 (CDT)  Doc ID   #[id number]  Job ID #[id number]    CC: Sydnee Bosch

## 2017-04-25 ENCOUNTER — PATIENT MESSAGE (OUTPATIENT)
Dept: OBSTETRICS AND GYNECOLOGY | Facility: CLINIC | Age: 32
End: 2017-04-25

## 2017-04-25 ENCOUNTER — PATIENT MESSAGE (OUTPATIENT)
Dept: INTERNAL MEDICINE | Facility: CLINIC | Age: 32
End: 2017-04-25

## 2017-05-17 ENCOUNTER — TELEPHONE (OUTPATIENT)
Dept: INTERNAL MEDICINE | Facility: CLINIC | Age: 32
End: 2017-05-17

## 2017-05-17 NOTE — TELEPHONE ENCOUNTER
----- Message from Melitadonato Bai sent at 5/17/2017  1:28 PM CDT -----  Contact: Cele with Adult Protective Services, [phone]  Cele called requesting to speak to you concerning a note in medical records for Pt.

## 2017-05-17 NOTE — TELEPHONE ENCOUNTER
On 2-16 pt had a fracture on her are. Adult services would like to know how and who caused this fracture. There are allegation however they would like clarification. Please advise

## 2018-12-17 ENCOUNTER — TELEPHONE (OUTPATIENT)
Dept: ORTHOPEDICS | Facility: CLINIC | Age: 33
End: 2018-12-17

## 2018-12-17 NOTE — TELEPHONE ENCOUNTER
Left a message for the patient to give the office a call back.FP      ----- Message from Jennifer Choudhury sent at 12/17/2018  2:08 PM CST -----  Contact: shon lebron-  Requesting a call back for a er f/u.Please call back at [phone] or [phone] .

## 2021-04-16 ENCOUNTER — PATIENT MESSAGE (OUTPATIENT)
Dept: RESEARCH | Facility: HOSPITAL | Age: 36
End: 2021-04-16

## 2024-05-21 ENCOUNTER — TELEPHONE (OUTPATIENT)
Dept: ENDOCRINOLOGY | Facility: CLINIC | Age: 39
End: 2024-05-21
Payer: MEDICAID

## 2024-05-21 NOTE — TELEPHONE ENCOUNTER
----- Message from Estrella Denny sent at 5/21/2024  9:15 AM CDT -----  Contact: Priti [phone]  Type: Needs Medical Advice  Who Called:  Pt caretaker Priti     Best Call Back Number: [phone]    Additional Information: Priti stated she has a referral in hand from a provider to Dr Valente. She stated her fax machine was broken and is asking how she can get referral sent over to the office. Pls call back and advise

## 2024-05-21 NOTE — TELEPHONE ENCOUNTER
----- Message from Estrella Denny sent at 5/21/2024  9:15 AM CDT -----  Contact: Priti [phone]  Type: Needs Medical Advice  Who Called:  Pt caretaker Priti     Best Call Back Number: [phone]    Additional Information: Priti stated she has a referral in hand from a provider to Dr Valente. She stated her fax machine was broken and is asking how she can get referral sent over to the office. Pls call back and advise

## 2024-09-25 NOTE — ED NOTES
PEC Packet faxed to: [facility], [facility], [facility], [facility], [facility], [facility], [facility], [facility], [facility], [facility], [facility], [facility],    yes